# Patient Record
Sex: FEMALE | Race: WHITE | URBAN - METROPOLITAN AREA
[De-identification: names, ages, dates, MRNs, and addresses within clinical notes are randomized per-mention and may not be internally consistent; named-entity substitution may affect disease eponyms.]

---

## 2019-01-18 ENCOUNTER — APPOINTMENT (RX ONLY)
Dept: URBAN - METROPOLITAN AREA CLINIC 101 | Facility: CLINIC | Age: 69
Setting detail: DERMATOLOGY
End: 2019-01-18

## 2019-01-18 DIAGNOSIS — H01.13 ECZEMATOUS DERMATITIS OF EYELID: ICD-10-CM

## 2019-01-18 PROBLEM — H01.135 ECZEMATOUS DERMATITIS OF LEFT LOWER EYELID: Status: ACTIVE | Noted: 2019-01-18

## 2019-01-18 PROBLEM — H01.134 ECZEMATOUS DERMATITIS OF LEFT UPPER EYELID: Status: ACTIVE | Noted: 2019-01-18

## 2019-01-18 PROBLEM — H01.131 ECZEMATOUS DERMATITIS OF RIGHT UPPER EYELID: Status: ACTIVE | Noted: 2019-01-18

## 2019-01-18 PROBLEM — J30.1 ALLERGIC RHINITIS DUE TO POLLEN: Status: ACTIVE | Noted: 2019-01-18

## 2019-01-18 PROBLEM — H01.139 ECZEMATOUS DERMATITIS OF UNSPECIFIED EYE, UNSPECIFIED EYELID: Status: ACTIVE | Noted: 2019-01-18

## 2019-01-18 PROBLEM — H01.132 ECZEMATOUS DERMATITIS OF RIGHT LOWER EYELID: Status: ACTIVE | Noted: 2019-01-18

## 2019-01-18 PROCEDURE — 99202 OFFICE O/P NEW SF 15 MIN: CPT

## 2019-01-18 PROCEDURE — ? COUNSELING

## 2019-01-18 PROCEDURE — ? ADDITIONAL NOTES

## 2019-01-18 PROCEDURE — ? TREATMENT REGIMEN

## 2019-01-18 PROCEDURE — ? RECOMMENDATIONS

## 2019-01-18 ASSESSMENT — LOCATION SIMPLE DESCRIPTION DERM
LOCATION SIMPLE: LEFT CHEEK
LOCATION SIMPLE: RIGHT INFERIOR EYELID
LOCATION SIMPLE: RIGHT CHEEK
LOCATION SIMPLE: RIGHT EYEBROW
LOCATION SIMPLE: LEFT SUPERIOR EYELID
LOCATION SIMPLE: RIGHT SUPERIOR EYELID
LOCATION SIMPLE: LEFT EYEBROW
LOCATION SIMPLE: LEFT INFERIOR EYELID

## 2019-01-18 ASSESSMENT — LOCATION DETAILED DESCRIPTION DERM
LOCATION DETAILED: LEFT LATERAL SUPERIOR EYELID
LOCATION DETAILED: RIGHT SUPERIOR CENTRAL MALAR CHEEK
LOCATION DETAILED: LEFT MEDIAL INFERIOR PRESEPTAL REGION
LOCATION DETAILED: RIGHT MEDIAL SUPERIOR EYELID
LOCATION DETAILED: LEFT SUPERIOR CENTRAL MALAR CHEEK
LOCATION DETAILED: RIGHT LATERAL EYEBROW
LOCATION DETAILED: LEFT LATERAL EYEBROW
LOCATION DETAILED: RIGHT MEDIAL INFERIOR EYELID

## 2019-01-18 ASSESSMENT — LOCATION ZONE DERM
LOCATION ZONE: FACE
LOCATION ZONE: EYELID

## 2019-01-18 NOTE — PROCEDURE: TREATMENT REGIMEN
Detail Level: Simple
Continue Regimen: Hydrocortisone 1% that was Rx’d by Dr. Cuevas, BID-TID for another 4 -5 days

## 2019-01-18 NOTE — PROCEDURE: RECOMMENDATIONS
Detail Level: Detailed
Recommendation Preamble: The following recommendations were made during the visit:
Recommendations (Free Text): Claratin OTC QD, Allergy testing with an Allergist.

## 2019-01-18 NOTE — PROCEDURE: ADDITIONAL NOTES
Detail Level: Simple
Additional Notes: Pt saw Dr. Cuevas (ophthalmologist) yesterday he recommended she see a dermatologist.

## 2022-10-10 ENCOUNTER — NEW PATIENT (OUTPATIENT)
Dept: URBAN - METROPOLITAN AREA CLINIC 47 | Facility: CLINIC | Age: 72
End: 2022-10-10

## 2022-10-10 DIAGNOSIS — H25.13: ICD-10-CM

## 2022-10-10 PROCEDURE — 99204 OFFICE O/P NEW MOD 45 MIN: CPT

## 2022-10-10 ASSESSMENT — VISUAL ACUITY
OS_CC: 20/25-1
OD_SC: J2
OD_SC: 20/400
OD_PH: 20/60-1
OS_GLARE: 20/40
OS_CC: J7

## 2022-10-10 ASSESSMENT — KERATOMETRY
OD_AXISANGLE2_DEGREES: 132
OD_K2POWER_DIOPTERS: 44.25
OD_AXISANGLE_DEGREES: 42
OD_K1POWER_DIOPTERS: 43.75
OS_K2POWER_DIOPTERS: 44.50
OS_AXISANGLE_DEGREES: 112
OS_AXISANGLE2_DEGREES: 22
OS_K1POWER_DIOPTERS: 43.75

## 2023-04-28 ENCOUNTER — DIAGNOSTICS ONLY (OUTPATIENT)
Dept: URBAN - METROPOLITAN AREA CLINIC 47 | Facility: CLINIC | Age: 73
End: 2023-04-28

## 2023-04-28 DIAGNOSIS — H25.13: ICD-10-CM

## 2023-04-28 PROCEDURE — 92136 OPHTHALMIC BIOMETRY: CPT

## 2023-04-28 ASSESSMENT — KERATOMETRY
OS_AXISANGLE_DEGREES: 112
OD_AXISANGLE_DEGREES: 42
OD_AXISANGLE2_DEGREES: 132
OS_K1POWER_DIOPTERS: 43.75
OS_AXISANGLE2_DEGREES: 22
OD_K1POWER_DIOPTERS: 43.75
OS_K2POWER_DIOPTERS: 44.50
OD_K2POWER_DIOPTERS: 44.25

## 2023-05-10 ENCOUNTER — SURGERY/PROCEDURE (OUTPATIENT)
Dept: SURGICAL CENTER 2 | Facility: SURGICAL CENTER | Age: 73
End: 2023-05-10

## 2023-05-10 DIAGNOSIS — H25.12: ICD-10-CM

## 2023-05-10 PROCEDURE — 66984 XCAPSL CTRC RMVL W/O ECP: CPT

## 2023-05-11 ENCOUNTER — 1 DAY POST-OP (OUTPATIENT)
Dept: URBAN - METROPOLITAN AREA CLINIC 47 | Facility: CLINIC | Age: 73
End: 2023-05-11

## 2023-05-11 DIAGNOSIS — H25.11: ICD-10-CM

## 2023-05-11 DIAGNOSIS — Z96.1: ICD-10-CM

## 2023-05-11 PROCEDURE — 99024 POSTOP FOLLOW-UP VISIT: CPT

## 2023-05-11 ASSESSMENT — KERATOMETRY
OS_K2POWER_DIOPTERS: 45.25
OD_AXISANGLE2_DEGREES: 132
OD_K1POWER_DIOPTERS: 43.75
OS_K2POWER_DIOPTERS: 44.50
OD_AXISANGLE2_DEGREES: 132
OD_K2POWER_DIOPTERS: 44.75
OD_AXISANGLE2_DEGREES: 140
OD_AXISANGLE_DEGREES: 42
OD_K1POWER_DIOPTERS: 43.75
OS_AXISANGLE_DEGREES: 112
OS_K1POWER_DIOPTERS: 43.75
OS_K2POWER_DIOPTERS: 44.50
OD_K2POWER_DIOPTERS: 44.25
OS_AXISANGLE_DEGREES: 112
OS_AXISANGLE2_DEGREES: 22
OD_AXISANGLE_DEGREES: 42
OS_AXISANGLE2_DEGREES: 22
OD_K2POWER_DIOPTERS: 44.25
OS_AXISANGLE_DEGREES: 125
OD_AXISANGLE_DEGREES: 50
OS_K1POWER_DIOPTERS: 43.75
OS_AXISANGLE2_DEGREES: 35

## 2023-05-11 ASSESSMENT — TONOMETRY
OS_IOP_MMHG: 18
OD_IOP_MMHG: 19

## 2023-05-11 ASSESSMENT — VISUAL ACUITY
OS_SC: 20/400
OS_SC: 20/60-2

## 2023-05-16 ENCOUNTER — PROBLEM (OUTPATIENT)
Dept: URBAN - METROPOLITAN AREA CLINIC 47 | Facility: CLINIC | Age: 73
End: 2023-05-16

## 2023-05-16 DIAGNOSIS — Z96.1: ICD-10-CM

## 2023-05-16 PROCEDURE — 99024 POSTOP FOLLOW-UP VISIT: CPT

## 2023-05-16 ASSESSMENT — KERATOMETRY
OD_AXISANGLE_DEGREES: 42
OS_K2POWER_DIOPTERS: 44.50
OS_AXISANGLE2_DEGREES: 35
OD_K1POWER_DIOPTERS: 43.75
OS_AXISANGLE_DEGREES: 125
OS_AXISANGLE_DEGREES: 112
OD_AXISANGLE_DEGREES: 50
OD_K2POWER_DIOPTERS: 44.75
OD_K2POWER_DIOPTERS: 44.25
OS_K1POWER_DIOPTERS: 43.75
OS_K2POWER_DIOPTERS: 45.25
OD_AXISANGLE2_DEGREES: 132
OD_AXISANGLE2_DEGREES: 140
OS_AXISANGLE2_DEGREES: 22

## 2023-05-16 ASSESSMENT — VISUAL ACUITY
OD_SC: 20/400
OD_PH: 20/50-1
OS_SC: 20/20

## 2023-05-16 ASSESSMENT — TONOMETRY
OS_IOP_MMHG: 9
OD_IOP_MMHG: 12

## 2023-06-14 ENCOUNTER — DIAGNOSTICS ONLY (OUTPATIENT)
Dept: URBAN - METROPOLITAN AREA CLINIC 47 | Facility: CLINIC | Age: 73
End: 2023-06-14

## 2023-06-14 ENCOUNTER — SURGERY/PROCEDURE (OUTPATIENT)
Dept: SURGICAL CENTER 2 | Facility: SURGICAL CENTER | Age: 73
End: 2023-06-14

## 2023-06-14 DIAGNOSIS — H25.811: ICD-10-CM

## 2023-06-14 DIAGNOSIS — H25.11: ICD-10-CM

## 2023-06-14 PROCEDURE — 66984 XCAPSL CTRC RMVL W/O ECP: CPT | Mod: 79,RT

## 2023-06-14 PROCEDURE — 92136 OPHTHALMIC BIOMETRY: CPT | Mod: 26,RT

## 2023-06-15 ENCOUNTER — 1 DAY POST-OP (OUTPATIENT)
Dept: URBAN - METROPOLITAN AREA CLINIC 47 | Facility: CLINIC | Age: 73
End: 2023-06-15

## 2023-06-15 DIAGNOSIS — Z96.1: ICD-10-CM

## 2023-06-15 PROCEDURE — 99024 POSTOP FOLLOW-UP VISIT: CPT

## 2023-06-15 ASSESSMENT — VISUAL ACUITY
OD_SC: 20/70-1
OS_SC: 20/20
OU_SC: J3

## 2023-06-15 ASSESSMENT — TONOMETRY
OS_IOP_MMHG: 11.0
OD_IOP_MMHG: 12.0

## 2023-06-15 ASSESSMENT — KERATOMETRY
OS_K2POWER_DIOPTERS: 45.00
OD_AXISANGLE_DEGREES: 23
OD_K1POWER_DIOPTERS: 43.50
OS_K1POWER_DIOPTERS: 43.75
OD_K2POWER_DIOPTERS: 44.75
OD_AXISANGLE2_DEGREES: 113
OS_AXISANGLE2_DEGREES: 83
OS_AXISANGLE_DEGREES: 173

## 2023-06-16 ASSESSMENT — KERATOMETRY
OD_AXISANGLE2_DEGREES: 132
OD_K1POWER_DIOPTERS: 43.75
OS_AXISANGLE_DEGREES: 112
OS_AXISANGLE2_DEGREES: 22
OS_K2POWER_DIOPTERS: 44.50
OS_AXISANGLE_DEGREES: 112
OS_AXISANGLE2_DEGREES: 22
OD_AXISANGLE_DEGREES: 42
OS_K2POWER_DIOPTERS: 44.50
OD_AXISANGLE2_DEGREES: 132
OS_K1POWER_DIOPTERS: 43.75
OD_K1POWER_DIOPTERS: 43.75
OS_K1POWER_DIOPTERS: 43.75
OD_K2POWER_DIOPTERS: 44.25
OD_K2POWER_DIOPTERS: 44.25
OD_AXISANGLE_DEGREES: 42

## 2023-07-06 ENCOUNTER — POST-OP CHECK (OUTPATIENT)
Dept: URBAN - METROPOLITAN AREA CLINIC 47 | Facility: CLINIC | Age: 73
End: 2023-07-06

## 2023-07-06 DIAGNOSIS — Z96.1: ICD-10-CM

## 2023-07-06 DIAGNOSIS — H52.11: ICD-10-CM

## 2023-07-06 PROCEDURE — 92015 DETERMINE REFRACTIVE STATE: CPT

## 2023-07-06 PROCEDURE — 99024 POSTOP FOLLOW-UP VISIT: CPT

## 2023-07-06 ASSESSMENT — KERATOMETRY
OD_K2POWER_DIOPTERS: 44.75
OS_K1POWER_DIOPTERS: 43.75
OD_AXISANGLE_DEGREES: 35
OD_AXISANGLE2_DEGREES: 125
OS_K2POWER_DIOPTERS: 45.00
OD_K1POWER_DIOPTERS: 43.50
OS_AXISANGLE2_DEGREES: 59
OS_AXISANGLE_DEGREES: 149

## 2023-07-06 ASSESSMENT — VISUAL ACUITY
OD_SC: 20/200
OD_PH: 20/40
OU_SC: J1-2
OS_SC: 20/20-1

## 2023-07-06 ASSESSMENT — TONOMETRY
OD_IOP_MMHG: 13.0
OS_IOP_MMHG: 13.0

## 2023-10-05 ENCOUNTER — FOLLOW UP (OUTPATIENT)
Dept: URBAN - METROPOLITAN AREA CLINIC 47 | Facility: CLINIC | Age: 73
End: 2023-10-05

## 2023-10-05 DIAGNOSIS — Z96.1: ICD-10-CM

## 2023-10-05 DIAGNOSIS — H00.11: ICD-10-CM

## 2023-10-05 DIAGNOSIS — H02.884: ICD-10-CM

## 2023-10-05 DIAGNOSIS — H02.881: ICD-10-CM

## 2023-10-05 PROCEDURE — 92012 INTRM OPH EXAM EST PATIENT: CPT

## 2023-10-05 ASSESSMENT — KERATOMETRY
OS_K2POWER_DIOPTERS: 45.00
OD_K2POWER_DIOPTERS: 44.75
OS_AXISANGLE_DEGREES: 149
OD_K1POWER_DIOPTERS: 43.50
OS_K1POWER_DIOPTERS: 43.75
OD_AXISANGLE_DEGREES: 35
OD_AXISANGLE2_DEGREES: 125
OS_AXISANGLE2_DEGREES: 59

## 2023-10-05 ASSESSMENT — TONOMETRY
OS_IOP_MMHG: 13
OD_IOP_MMHG: 15

## 2023-10-05 ASSESSMENT — VISUAL ACUITY
OS_SC: 20/20
OD_SC: 20/20
OU_SC: J1

## 2024-04-02 ENCOUNTER — PROBLEM (OUTPATIENT)
Dept: URBAN - METROPOLITAN AREA CLINIC 47 | Facility: CLINIC | Age: 74
End: 2024-04-02

## 2024-04-02 DIAGNOSIS — H35.3131: ICD-10-CM

## 2024-04-02 DIAGNOSIS — Z96.1: ICD-10-CM

## 2024-04-02 DIAGNOSIS — H02.881: ICD-10-CM

## 2024-04-02 DIAGNOSIS — H43.811: ICD-10-CM

## 2024-04-02 DIAGNOSIS — H02.884: ICD-10-CM

## 2024-04-02 PROCEDURE — 92014 COMPRE OPH EXAM EST PT 1/>: CPT

## 2024-04-02 ASSESSMENT — TONOMETRY
OS_IOP_MMHG: 10
OD_IOP_MMHG: 12

## 2024-04-02 ASSESSMENT — KERATOMETRY
OS_K2POWER_DIOPTERS: 45.00
OD_AXISANGLE_DEGREES: 35
OD_AXISANGLE2_DEGREES: 125
OS_AXISANGLE_DEGREES: 149
OS_K1POWER_DIOPTERS: 43.75
OD_K2POWER_DIOPTERS: 44.75
OD_K1POWER_DIOPTERS: 43.50
OS_AXISANGLE2_DEGREES: 59

## 2024-04-02 ASSESSMENT — VISUAL ACUITY
OD_SC: 20/100
OS_SC: 20/30
OD_PH: 20/60